# Patient Record
Sex: MALE | Race: WHITE
[De-identification: names, ages, dates, MRNs, and addresses within clinical notes are randomized per-mention and may not be internally consistent; named-entity substitution may affect disease eponyms.]

---

## 2018-09-28 ENCOUNTER — HOSPITAL ENCOUNTER (INPATIENT)
Dept: HOSPITAL 54 - TELE-TD | Age: 53
LOS: 1 days | Discharge: LEFT BEFORE DISCHARGE | DRG: 135 | End: 2018-09-29
Attending: INTERNAL MEDICINE | Admitting: NURSE PRACTITIONER
Payer: MEDICAID

## 2018-09-28 VITALS — DIASTOLIC BLOOD PRESSURE: 94 MMHG | SYSTOLIC BLOOD PRESSURE: 136 MMHG

## 2018-09-28 VITALS — BODY MASS INDEX: 30.82 KG/M2 | HEIGHT: 65 IN | WEIGHT: 185 LBS

## 2018-09-28 DIAGNOSIS — S22.41XA: Primary | ICD-10-CM

## 2018-09-28 DIAGNOSIS — Z59.0: ICD-10-CM

## 2018-09-28 DIAGNOSIS — I25.10: ICD-10-CM

## 2018-09-28 DIAGNOSIS — Z88.6: ICD-10-CM

## 2018-09-28 DIAGNOSIS — Z98.890: ICD-10-CM
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
## 2018-09-28 DIAGNOSIS — R07.1: ICD-10-CM

## 2018-09-28 DIAGNOSIS — Z95.5: ICD-10-CM

## 2018-09-28 LAB
ALBUMIN SERPL BCP-MCNC: 3.1 G/DL (ref 3.4–5)
ALP SERPL-CCNC: 71 U/L (ref 46–116)
ALT SERPL W P-5'-P-CCNC: 46 U/L (ref 12–78)
AST SERPL W P-5'-P-CCNC: 34 U/L (ref 15–37)
BASOPHILS # BLD AUTO: 0.3 /CMM (ref 0–0.2)
BASOPHILS NFR BLD AUTO: 1.8 % (ref 0–2)
BILIRUB SERPL-MCNC: 0.4 MG/DL (ref 0.2–1)
BUN SERPL-MCNC: 18 MG/DL (ref 7–18)
CALCIUM SERPL-MCNC: 8.3 MG/DL (ref 8.5–10.1)
CHLORIDE SERPL-SCNC: 101 MMOL/L (ref 98–107)
CO2 SERPL-SCNC: 27 MMOL/L (ref 21–32)
CREAT SERPL-MCNC: 1.1 MG/DL (ref 0.6–1.3)
EOSINOPHIL NFR BLD AUTO: 2.9 % (ref 0–6)
GLUCOSE SERPL-MCNC: 130 MG/DL (ref 74–106)
HCT VFR BLD AUTO: 42 % (ref 39–51)
HGB BLD-MCNC: 13.7 G/DL (ref 13.5–17.5)
LYMPHOCYTES NFR BLD AUTO: 19.2 % (ref 20–44)
LYMPHOCYTES NFR BLD AUTO: 3.2 /CMM (ref 0.8–4.8)
MCHC RBC AUTO-ENTMCNC: 33 G/DL (ref 31–36)
MCV RBC AUTO: 85 FL (ref 80–96)
MONOCYTES NFR BLD AUTO: 1.9 /CMM (ref 0.1–1.3)
MONOCYTES NFR BLD AUTO: 11.3 % (ref 2–12)
NEUTROPHILS # BLD AUTO: 10.7 /CMM (ref 1.8–8.9)
NEUTROPHILS NFR BLD AUTO: 64.8 % (ref 43–81)
PLATELET # BLD AUTO: 390 /CMM (ref 150–450)
POTASSIUM SERPL-SCNC: 3.7 MMOL/L (ref 3.5–5.1)
PROT SERPL-MCNC: 6.6 G/DL (ref 6.4–8.2)
RBC # BLD AUTO: 4.92 MIL/UL (ref 4.5–6)
RDW COEFFICIENT OF VARIATION: 14.1 (ref 11.5–15)
SODIUM SERPL-SCNC: 138 MMOL/L (ref 136–145)
WBC NRBC COR # BLD AUTO: 16.5 K/UL (ref 4.3–11)

## 2018-09-28 PROCEDURE — Z7610: HCPCS

## 2018-09-28 PROCEDURE — A4606 OXYGEN PROBE USED W OXIMETER: HCPCS

## 2018-09-28 RX ADMIN — HYDROMORPHONE HYDROCHLORIDE PRN MG: 2 INJECTION, SOLUTION INTRAMUSCULAR; INTRAVENOUS; SUBCUTANEOUS at 21:43

## 2018-09-28 SDOH — ECONOMIC STABILITY - HOUSING INSECURITY: HOMELESSNESS: Z59.0

## 2018-09-28 NOTE — NUR
TD RN NOTES



PHARMACY CALLED REGARDING PT ALLERGY TO CODEINE AND DR ROWE ORDERED DILAUDID, RN TOLD 
PHARMACY THAT PT HAD DILAUDID IN [place] WITH NO REACTION. PER PHARMACY OKAY TO GIVE 
DILAUDID.

## 2018-09-28 NOTE — NUR
TD RN NOTES



RECEIVED PT FROM EMT WITH [facility]. PT ON NASAL CANNULA 3LPM SATURATING WELL. ON TELE MONITOR 
SR 90. V/S SIGNS CHECKED AND WNL. IV ACCESS ON RAC G18 PATENT AND INTACT. PT COMPLAINTS OF 
GENERALIZED SEVERE PAIN. SKIN CHECKED DONE AND PICTURES PRINT FOR ADMISSION. BELONGING LIST 
CHECKED AND SIGNED CALLED DR ROWE FOR ADMITTING ORDERS. PER DR ROWE HE WILL COME 
AND SEE THE PT. WILL CONTINUE TO MONITOR PT CLOSELY.

## 2018-09-29 VITALS — SYSTOLIC BLOOD PRESSURE: 122 MMHG | DIASTOLIC BLOOD PRESSURE: 71 MMHG

## 2018-09-29 VITALS — DIASTOLIC BLOOD PRESSURE: 67 MMHG | SYSTOLIC BLOOD PRESSURE: 116 MMHG

## 2018-09-29 VITALS — SYSTOLIC BLOOD PRESSURE: 125 MMHG | DIASTOLIC BLOOD PRESSURE: 80 MMHG

## 2018-09-29 VITALS — SYSTOLIC BLOOD PRESSURE: 105 MMHG | DIASTOLIC BLOOD PRESSURE: 40 MMHG

## 2018-09-29 VITALS — DIASTOLIC BLOOD PRESSURE: 69 MMHG | SYSTOLIC BLOOD PRESSURE: 106 MMHG

## 2018-09-29 LAB
BASOPHILS # BLD AUTO: 0.1 /CMM (ref 0–0.2)
BASOPHILS NFR BLD AUTO: 0.6 % (ref 0–2)
BUN SERPL-MCNC: 18 MG/DL (ref 7–18)
CALCIUM SERPL-MCNC: 8.4 MG/DL (ref 8.5–10.1)
CHLORIDE SERPL-SCNC: 103 MMOL/L (ref 98–107)
CHOLEST SERPL-MCNC: 145 MG/DL (ref ?–200)
CO2 SERPL-SCNC: 27 MMOL/L (ref 21–32)
CREAT SERPL-MCNC: 1 MG/DL (ref 0.6–1.3)
EOSINOPHIL NFR BLD AUTO: 2.9 % (ref 0–6)
GLUCOSE SERPL-MCNC: 107 MG/DL (ref 74–106)
HCT VFR BLD AUTO: 42 % (ref 39–51)
HDLC SERPL-MCNC: 31 MG/DL (ref 40–60)
HGB BLD-MCNC: 13.7 G/DL (ref 13.5–17.5)
LDLC SERPL DIRECT ASSAY-MCNC: 78 MG/DL (ref 0–99)
LYMPHOCYTES NFR BLD AUTO: 16.7 % (ref 20–44)
LYMPHOCYTES NFR BLD AUTO: 2.5 /CMM (ref 0.8–4.8)
MAGNESIUM SERPL-MCNC: 1.9 MG/DL (ref 1.8–2.4)
MCHC RBC AUTO-ENTMCNC: 33 G/DL (ref 31–36)
MCV RBC AUTO: 85 FL (ref 80–96)
MONOCYTES NFR BLD AUTO: 1.9 /CMM (ref 0.1–1.3)
MONOCYTES NFR BLD AUTO: 13 % (ref 2–12)
NEUTROPHILS # BLD AUTO: 9.8 /CMM (ref 1.8–8.9)
NEUTROPHILS NFR BLD AUTO: 66.8 % (ref 43–81)
PHOSPHATE SERPL-MCNC: 3.9 MG/DL (ref 2.5–4.9)
PLATELET # BLD AUTO: 378 /CMM (ref 150–450)
POTASSIUM SERPL-SCNC: 4.3 MMOL/L (ref 3.5–5.1)
RBC # BLD AUTO: 4.86 MIL/UL (ref 4.5–6)
RDW COEFFICIENT OF VARIATION: 14 (ref 11.5–15)
SODIUM SERPL-SCNC: 136 MMOL/L (ref 136–145)
TRIGL SERPL-MCNC: 318 MG/DL (ref 30–150)
WBC NRBC COR # BLD AUTO: 14.7 K/UL (ref 4.3–11)

## 2018-09-29 RX ADMIN — HYDROMORPHONE HYDROCHLORIDE PRN MG: 2 INJECTION, SOLUTION INTRAMUSCULAR; INTRAVENOUS; SUBCUTANEOUS at 08:23

## 2018-09-29 RX ADMIN — HYDROMORPHONE HYDROCHLORIDE PRN MG: 2 INJECTION, SOLUTION INTRAMUSCULAR; INTRAVENOUS; SUBCUTANEOUS at 02:54

## 2018-09-29 RX ADMIN — HYDROMORPHONE HYDROCHLORIDE PRN MG: 2 INJECTION, SOLUTION INTRAMUSCULAR; INTRAVENOUS; SUBCUTANEOUS at 13:40

## 2018-09-29 NOTE — NUR
TD RN NOTES



PT REFUSED REPOSITIONING SINCE HE IS IN A LOT OF PAIN, PT ALSO REFUSED TO HAVE HIS LEFT LEG 
TO BE ASSESSED DUE TO SEVERE PAIN. EXPLAINED RISK AND BENEFITS. WILL CONTINUE TO MONITOR

## 2018-09-29 NOTE — NUR
TD RN NOTES



NO ACUTE CHANGES NOTED DURING THE SHIFT. PROVIDED COMFORT AND SAFETY. ENDORSE TO THE AM 
NURSE FOR CONTINUITY OF CARE.

## 2018-09-29 NOTE — NUR
patient aaox4 follow commands o2 3l n/c no sob non labored breathing abdomen soft non tender 
diet tolerated tele;sr denied chest pain but generalized pain complained dilaudid 1mg ivp 
given per mar 

lt leg cast keep pt able to out of bed use w/chair with assistance will coninue monitor

-------------------------------------------------------------------------------

Addendum: 09/29/18 at 1633 by RANCHO MINAYA RN

-------------------------------------------------------------------------------

patient want discharge now explained to pt no order for discharge but pt refused all 
treatment and nursing care pt refused to AMA sign informed pmd iv help lock tele monitor 
removed all belongs released 

pt leave hospital at 1630